# Patient Record
Sex: FEMALE | Race: WHITE | NOT HISPANIC OR LATINO | Employment: OTHER | URBAN - METROPOLITAN AREA
[De-identification: names, ages, dates, MRNs, and addresses within clinical notes are randomized per-mention and may not be internally consistent; named-entity substitution may affect disease eponyms.]

---

## 2024-05-13 ENCOUNTER — OFFICE VISIT (OUTPATIENT)
Dept: PRIMARY CARE | Facility: CLINIC | Age: 69
End: 2024-05-13
Payer: MEDICARE

## 2024-05-13 DIAGNOSIS — Z00.00 WELLNESS EXAMINATION: ICD-10-CM

## 2024-05-13 DIAGNOSIS — Z71.9 ENCOUNTER FOR CONSULTATION: Primary | ICD-10-CM

## 2024-05-13 PROCEDURE — UHSMG PR UH SELECT MEET AND GREET: Performed by: INTERNAL MEDICINE

## 2024-05-13 NOTE — PROGRESS NOTES
Patient presents for meet/greet.  Most recent primary care doctor was Dr. Francisco Hutchison ([facility])  She is due for a wellness exam/physical  She is seeing various specialists for chronic left foot pain and possible lumbar arthritis/back pain.    She has upcoming appointment with [facility] orthopedics for left foot, pain injection per spine clinic scheduled in July    Information regarding practice provided.  Patient will be scheduling physical with lab work prior.    Parth Read MD

## 2024-05-13 NOTE — PATIENT INSTRUCTIONS
Fasting lab work will be ordered to be done prior to visit.  Please report to any  lab 3 to 5 days prior to date of physical.  Do not eat for 10-12 hours prior to lab work.  Black coffee or water are acceptable the morning of lab work.    Please bring in all medication bottles (both prescription and over-the-counter) to visit.

## 2024-05-21 ENCOUNTER — TELEPHONE (OUTPATIENT)
Dept: PRIMARY CARE | Facility: CLINIC | Age: 69
End: 2024-05-21
Payer: MEDICARE

## 2024-05-21 NOTE — TELEPHONE ENCOUNTER
Charlene would like to discuss her recent ortho visit.  She would like a second opinion on her left foot.  She was told that she needs surgery.  Please review recent radiology reports, ortho notes.  Call [phone].  Thank you

## 2024-05-23 NOTE — TELEPHONE ENCOUNTER
Left a voicemail for patient to call back to discuss recent orthopedic appointment.    Parth Read MD

## 2024-05-27 NOTE — TELEPHONE ENCOUNTER
Charlene called because she's having severe pain in her left foot.  She can barely walk and it's keeping her up at night.  Please call [phone].  Thank you

## 2024-05-27 NOTE — TELEPHONE ENCOUNTER
Patient and I spoke.  She has seen Dr. Dhillon [facility] for persistent left foot pain.  Patient previously had foot surgery with Dr. Thompson in 2021.  CT of foot notable for loosening of 1 screw in fracture of another screw at area of previous surgery.    I have advised the patient follow-up with current [facility] orthopedic specialist to follow through on recommendation for surgical repair.    Patient will keep us posted.    Parth Read MD

## 2024-06-25 ENCOUNTER — TELEPHONE (OUTPATIENT)
Dept: PRIMARY CARE | Facility: CLINIC | Age: 69
End: 2024-06-25
Payer: MEDICARE

## 2024-06-26 NOTE — TELEPHONE ENCOUNTER
Patient was seen about a month ago for meet/greet.  She was scheduled for physical in August, but has canceled.  No additional appointment has been made.    Please contact patient regarding scheduling a physical.  If any issues, please route back to me.    Parth Read MD

## 2024-06-26 NOTE — TELEPHONE ENCOUNTER
Charlene cancelled her membership because she prefers a female provider.  She transferred to Dr. Padmaja Mcleod.

## 2024-08-08 ENCOUNTER — APPOINTMENT (OUTPATIENT)
Dept: PRIMARY CARE | Facility: CLINIC | Age: 69
End: 2024-08-08
Payer: MEDICARE

## 2024-08-22 ENCOUNTER — APPOINTMENT (OUTPATIENT)
Dept: ORTHOPEDIC SURGERY | Facility: CLINIC | Age: 69
End: 2024-08-22
Payer: MEDICARE

## 2024-08-26 ENCOUNTER — APPOINTMENT (OUTPATIENT)
Dept: ORTHOPEDIC SURGERY | Facility: CLINIC | Age: 69
End: 2024-08-26
Payer: MEDICARE

## 2025-06-25 ENCOUNTER — OFFICE VISIT (OUTPATIENT)
Dept: ORTHOPEDIC SURGERY | Facility: CLINIC | Age: 70
End: 2025-06-25
Payer: MEDICARE

## 2025-06-25 ENCOUNTER — HOSPITAL ENCOUNTER (OUTPATIENT)
Dept: RADIOLOGY | Facility: CLINIC | Age: 70
Discharge: HOME | End: 2025-06-25
Payer: MEDICARE

## 2025-06-25 DIAGNOSIS — M79.672 LEFT FOOT PAIN: ICD-10-CM

## 2025-06-25 PROCEDURE — 99203 OFFICE O/P NEW LOW 30 MIN: CPT | Performed by: ORTHOPAEDIC SURGERY

## 2025-06-25 PROCEDURE — 1125F AMNT PAIN NOTED PAIN PRSNT: CPT | Performed by: ORTHOPAEDIC SURGERY

## 2025-06-25 PROCEDURE — G2211 COMPLEX E/M VISIT ADD ON: HCPCS | Performed by: ORTHOPAEDIC SURGERY

## 2025-06-25 PROCEDURE — 73630 X-RAY EXAM OF FOOT: CPT | Mod: LEFT SIDE | Performed by: RADIOLOGY

## 2025-06-25 PROCEDURE — 73630 X-RAY EXAM OF FOOT: CPT | Mod: LT

## 2025-06-25 ASSESSMENT — PAIN SCALES - GENERAL: PAINLEVEL_OUTOF10: 8

## 2025-06-25 ASSESSMENT — PAIN - FUNCTIONAL ASSESSMENT: PAIN_FUNCTIONAL_ASSESSMENT: 0-10

## 2025-06-25 NOTE — PROGRESS NOTES
Charlene Nava    CHIEF COMPLAINT:  Left foot pain    Surgery Date: 9/9/2024  Surgery: L 1st and 2nd TMT joint fusion     HISTORY OF PRESENT ILLNESS:  This is a 69 y.o. female who returns today for  follow up of her L foot pain. 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   She reports continued midfoot pain. She had a CT done at [facility] which showed solid 1st TMT and partial 2nd TMT joint fusion.     She was seen by Dr. Yu who recommended a bone stimulator, but she has not gotten that yet.       Assessment and Plan:  1. Left foot pain  - L foot XR ordered and reviewed  - L foot CT scan report reviewed  - XR foot left 3+ views; Future    Charlene and I discussed that on exam, her pain seems to be more towards her 3rd TMT joint, rather than over her 1st or 2nd. We are going to try an injection into the NC joint, 3rd TMT joint, and I will refer her to one of my colleagues.     Thank you for the opportunity to participate in this patient's care.    Physical Exam:  Well appearing female in no acute distress; Alert and oriented.  Left  Leg:  No swelling, erythema, warmth   tenderness to palpation of the NC joint,  3rd TMT joint; minimal tenderness over the 1st and 2nd TMT joint.   Palpable pulses, sensation is intact  Able to flex and extend toes without difficulty      IMAGING:     Imaging was ordered today. Final results and radiologist's interpretation, available in the [facility] health record. Images were reviewed with the patient/family members in the office today. 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